# Patient Record
Sex: MALE | Race: WHITE | NOT HISPANIC OR LATINO | Employment: FULL TIME | URBAN - METROPOLITAN AREA
[De-identification: names, ages, dates, MRNs, and addresses within clinical notes are randomized per-mention and may not be internally consistent; named-entity substitution may affect disease eponyms.]

---

## 2017-11-13 ENCOUNTER — APPOINTMENT (OUTPATIENT)
Dept: SOCIAL WORK | Facility: CLINIC | Age: 32
End: 2017-11-13
Payer: COMMERCIAL

## 2017-11-13 PROCEDURE — 90686 IIV4 VACC NO PRSV 0.5 ML IM: CPT | Performed by: REGISTERED NURSE

## 2017-11-13 PROCEDURE — 90471 IMMUNIZATION ADMIN: CPT | Performed by: REGISTERED NURSE

## 2020-05-25 ENCOUNTER — OFFICE VISIT (OUTPATIENT)
Dept: ADMISSIONS | Facility: MEDICAL CENTER | Age: 35
End: 2020-05-25
Attending: ORTHOPAEDIC SURGERY
Payer: COMMERCIAL

## 2020-05-25 DIAGNOSIS — Z01.812 PRE-OPERATIVE LABORATORY EXAMINATION: ICD-10-CM

## 2020-05-25 LAB — COVID ORDER STATUS COVID19: NORMAL

## 2020-05-25 PROCEDURE — C9803 HOPD COVID-19 SPEC COLLECT: HCPCS

## 2020-05-25 RX ORDER — LANSOPRAZOLE 30 MG/1
CAPSULE, DELAYED RELEASE ORAL
COMMUNITY
Start: 2020-04-05

## 2020-05-25 NOTE — OR NURSING
"Preadmit appointment: \" Preparing for your Procedure information\" sheet given to patient with verbal and written instructions. Patient instructed to continue prescribed medications through the day before surgery, instructed to take the following medications the day of surgery per anesthesia protocol: prevacid.  "

## 2020-05-28 ENCOUNTER — ANESTHESIA EVENT (OUTPATIENT)
Dept: SURGERY | Facility: MEDICAL CENTER | Age: 35
End: 2020-05-28
Payer: COMMERCIAL

## 2020-05-28 ENCOUNTER — ANESTHESIA (OUTPATIENT)
Dept: SURGERY | Facility: MEDICAL CENTER | Age: 35
End: 2020-05-28
Payer: COMMERCIAL

## 2020-05-28 ENCOUNTER — HOSPITAL ENCOUNTER (OUTPATIENT)
Facility: MEDICAL CENTER | Age: 35
End: 2020-05-28
Attending: ORTHOPAEDIC SURGERY | Admitting: ORTHOPAEDIC SURGERY
Payer: COMMERCIAL

## 2020-05-28 VITALS
TEMPERATURE: 97.5 F | DIASTOLIC BLOOD PRESSURE: 82 MMHG | SYSTOLIC BLOOD PRESSURE: 120 MMHG | HEART RATE: 79 BPM | OXYGEN SATURATION: 92 % | RESPIRATION RATE: 16 BRPM | BODY MASS INDEX: 28.99 KG/M2 | HEIGHT: 72 IN | WEIGHT: 214.07 LBS

## 2020-05-28 LAB
SARS-COV-2 RNA RESP QL NAA+PROBE: NOT DETECTED
SPECIMEN SOURCE: NORMAL

## 2020-05-28 PROCEDURE — 160025 RECOVERY II MINUTES (STATS): Performed by: ORTHOPAEDIC SURGERY

## 2020-05-28 PROCEDURE — C1713 ANCHOR/SCREW BN/BN,TIS/BN: HCPCS | Performed by: ORTHOPAEDIC SURGERY

## 2020-05-28 PROCEDURE — 160041 HCHG SURGERY MINUTES - EA ADDL 1 MIN LEVEL 4: Performed by: ORTHOPAEDIC SURGERY

## 2020-05-28 PROCEDURE — 160009 HCHG ANES TIME/MIN: Performed by: ORTHOPAEDIC SURGERY

## 2020-05-28 PROCEDURE — 160029 HCHG SURGERY MINUTES - 1ST 30 MINS LEVEL 4: Performed by: ORTHOPAEDIC SURGERY

## 2020-05-28 PROCEDURE — A9270 NON-COVERED ITEM OR SERVICE: HCPCS | Performed by: ANESTHESIOLOGY

## 2020-05-28 PROCEDURE — 700102 HCHG RX REV CODE 250 W/ 637 OVERRIDE(OP): Performed by: ORTHOPAEDIC SURGERY

## 2020-05-28 PROCEDURE — 160048 HCHG OR STATISTICAL LEVEL 1-5: Performed by: ORTHOPAEDIC SURGERY

## 2020-05-28 PROCEDURE — 160046 HCHG PACU - 1ST 60 MINS PHASE II: Performed by: ORTHOPAEDIC SURGERY

## 2020-05-28 PROCEDURE — 160035 HCHG PACU - 1ST 60 MINS PHASE I: Performed by: ORTHOPAEDIC SURGERY

## 2020-05-28 PROCEDURE — A6403 STERILE GAUZE>16 <= 48 SQ IN: HCPCS | Performed by: ORTHOPAEDIC SURGERY

## 2020-05-28 PROCEDURE — 160002 HCHG RECOVERY MINUTES (STAT): Performed by: ORTHOPAEDIC SURGERY

## 2020-05-28 PROCEDURE — E0114 CRUTCH UNDERARM PAIR NO WOOD: HCPCS | Performed by: ORTHOPAEDIC SURGERY

## 2020-05-28 PROCEDURE — 502580 HCHG PACK, KNEE ARTHROSCOPY: Performed by: ORTHOPAEDIC SURGERY

## 2020-05-28 PROCEDURE — 700111 HCHG RX REV CODE 636 W/ 250 OVERRIDE (IP): Performed by: ORTHOPAEDIC SURGERY

## 2020-05-28 PROCEDURE — 501838 HCHG SUTURE GENERAL: Performed by: ORTHOPAEDIC SURGERY

## 2020-05-28 PROCEDURE — 700101 HCHG RX REV CODE 250: Performed by: ANESTHESIOLOGY

## 2020-05-28 PROCEDURE — 700101 HCHG RX REV CODE 250: Performed by: ORTHOPAEDIC SURGERY

## 2020-05-28 PROCEDURE — A6223 GAUZE >16<=48 NO W/SAL W/O B: HCPCS | Performed by: ORTHOPAEDIC SURGERY

## 2020-05-28 PROCEDURE — 501512 HCHG SUTURE PASSER, KNOT PUSHER: Performed by: ORTHOPAEDIC SURGERY

## 2020-05-28 PROCEDURE — 700105 HCHG RX REV CODE 258: Performed by: ANESTHESIOLOGY

## 2020-05-28 PROCEDURE — 500028 HCHG ARTHROWAND TURBOVAC 3.5/90 SUCT.: Performed by: ORTHOPAEDIC SURGERY

## 2020-05-28 PROCEDURE — 700111 HCHG RX REV CODE 636 W/ 250 OVERRIDE (IP): Performed by: ANESTHESIOLOGY

## 2020-05-28 PROCEDURE — 700105 HCHG RX REV CODE 258: Performed by: ORTHOPAEDIC SURGERY

## 2020-05-28 PROCEDURE — A9270 NON-COVERED ITEM OR SERVICE: HCPCS | Performed by: ORTHOPAEDIC SURGERY

## 2020-05-28 PROCEDURE — 160036 HCHG PACU - EA ADDL 30 MINS PHASE I: Performed by: ORTHOPAEDIC SURGERY

## 2020-05-28 PROCEDURE — 700102 HCHG RX REV CODE 250 W/ 637 OVERRIDE(OP): Performed by: ANESTHESIOLOGY

## 2020-05-28 DEVICE — ANCHOR SUTURE FASTFIX 360 CURVED: Type: IMPLANTABLE DEVICE | Site: KNEE | Status: FUNCTIONAL

## 2020-05-28 RX ORDER — MORPHINE SULFATE 10 MG/ML
INJECTION, SOLUTION INTRAMUSCULAR; INTRAVENOUS
Status: DISCONTINUED | OUTPATIENT
Start: 2020-05-28 | End: 2020-05-28 | Stop reason: HOSPADM

## 2020-05-28 RX ORDER — ONDANSETRON 2 MG/ML
4 INJECTION INTRAMUSCULAR; INTRAVENOUS
Status: DISCONTINUED | OUTPATIENT
Start: 2020-05-28 | End: 2020-05-28 | Stop reason: HOSPADM

## 2020-05-28 RX ORDER — SODIUM CHLORIDE, SODIUM LACTATE, POTASSIUM CHLORIDE, CALCIUM CHLORIDE 600; 310; 30; 20 MG/100ML; MG/100ML; MG/100ML; MG/100ML
1000 INJECTION, SOLUTION INTRAVENOUS
Status: COMPLETED | OUTPATIENT
Start: 2020-05-28 | End: 2020-05-28

## 2020-05-28 RX ORDER — ACETAMINOPHEN 500 MG
1000 TABLET ORAL ONCE
Status: DISCONTINUED | OUTPATIENT
Start: 2020-05-28 | End: 2020-05-28 | Stop reason: HOSPADM

## 2020-05-28 RX ORDER — OXYCODONE HCL 5 MG/5 ML
5 SOLUTION, ORAL ORAL
Status: COMPLETED | OUTPATIENT
Start: 2020-05-28 | End: 2020-05-28

## 2020-05-28 RX ORDER — LABETALOL HYDROCHLORIDE 5 MG/ML
5 INJECTION, SOLUTION INTRAVENOUS
Status: DISCONTINUED | OUTPATIENT
Start: 2020-05-28 | End: 2020-05-28 | Stop reason: HOSPADM

## 2020-05-28 RX ORDER — SODIUM CHLORIDE, SODIUM LACTATE, POTASSIUM CHLORIDE, CALCIUM CHLORIDE 600; 310; 30; 20 MG/100ML; MG/100ML; MG/100ML; MG/100ML
INJECTION, SOLUTION INTRAVENOUS
Status: DISCONTINUED | OUTPATIENT
Start: 2020-05-28 | End: 2020-05-28 | Stop reason: SURG

## 2020-05-28 RX ORDER — BUPIVACAINE HYDROCHLORIDE AND EPINEPHRINE 2.5; 5 MG/ML; UG/ML
INJECTION, SOLUTION EPIDURAL; INFILTRATION; INTRACAUDAL; PERINEURAL
Status: DISCONTINUED | OUTPATIENT
Start: 2020-05-28 | End: 2020-05-28 | Stop reason: HOSPADM

## 2020-05-28 RX ORDER — CELECOXIB 200 MG/1
400 CAPSULE ORAL ONCE
Status: DISCONTINUED | OUTPATIENT
Start: 2020-05-28 | End: 2020-05-28 | Stop reason: HOSPADM

## 2020-05-28 RX ORDER — OXYCODONE HCL 5 MG/5 ML
10 SOLUTION, ORAL ORAL
Status: COMPLETED | OUTPATIENT
Start: 2020-05-28 | End: 2020-05-28

## 2020-05-28 RX ORDER — LIDOCAINE HYDROCHLORIDE 10 MG/ML
INJECTION, SOLUTION INFILTRATION; PERINEURAL
Status: DISCONTINUED | OUTPATIENT
Start: 2020-05-28 | End: 2020-05-28 | Stop reason: HOSPADM

## 2020-05-28 RX ORDER — DEXAMETHASONE SODIUM PHOSPHATE 4 MG/ML
INJECTION, SOLUTION INTRA-ARTICULAR; INTRALESIONAL; INTRAMUSCULAR; INTRAVENOUS; SOFT TISSUE PRN
Status: DISCONTINUED | OUTPATIENT
Start: 2020-05-28 | End: 2020-05-28 | Stop reason: SURG

## 2020-05-28 RX ORDER — ONDANSETRON 2 MG/ML
INJECTION INTRAMUSCULAR; INTRAVENOUS PRN
Status: DISCONTINUED | OUTPATIENT
Start: 2020-05-28 | End: 2020-05-28 | Stop reason: SURG

## 2020-05-28 RX ORDER — MIDAZOLAM HYDROCHLORIDE 1 MG/ML
INJECTION INTRAMUSCULAR; INTRAVENOUS PRN
Status: DISCONTINUED | OUTPATIENT
Start: 2020-05-28 | End: 2020-05-28 | Stop reason: SURG

## 2020-05-28 RX ORDER — CEFAZOLIN SODIUM 1 G/3ML
INJECTION, POWDER, FOR SOLUTION INTRAMUSCULAR; INTRAVENOUS PRN
Status: DISCONTINUED | OUTPATIENT
Start: 2020-05-28 | End: 2020-05-28 | Stop reason: SURG

## 2020-05-28 RX ORDER — HYDRALAZINE HYDROCHLORIDE 20 MG/ML
5 INJECTION INTRAMUSCULAR; INTRAVENOUS
Status: DISCONTINUED | OUTPATIENT
Start: 2020-05-28 | End: 2020-05-28 | Stop reason: HOSPADM

## 2020-05-28 RX ORDER — HALOPERIDOL 5 MG/ML
1 INJECTION INTRAMUSCULAR
Status: DISCONTINUED | OUTPATIENT
Start: 2020-05-28 | End: 2020-05-28 | Stop reason: HOSPADM

## 2020-05-28 RX ORDER — SODIUM CHLORIDE, SODIUM LACTATE, POTASSIUM CHLORIDE, CALCIUM CHLORIDE 600; 310; 30; 20 MG/100ML; MG/100ML; MG/100ML; MG/100ML
INJECTION, SOLUTION INTRAVENOUS CONTINUOUS
Status: DISCONTINUED | OUTPATIENT
Start: 2020-05-28 | End: 2020-05-28 | Stop reason: HOSPADM

## 2020-05-28 RX ORDER — LIDOCAINE HYDROCHLORIDE 20 MG/ML
INJECTION, SOLUTION EPIDURAL; INFILTRATION; INTRACAUDAL; PERINEURAL PRN
Status: DISCONTINUED | OUTPATIENT
Start: 2020-05-28 | End: 2020-05-28 | Stop reason: SURG

## 2020-05-28 RX ORDER — DIPHENHYDRAMINE HYDROCHLORIDE 50 MG/ML
12.5 INJECTION INTRAMUSCULAR; INTRAVENOUS
Status: DISCONTINUED | OUTPATIENT
Start: 2020-05-28 | End: 2020-05-28 | Stop reason: HOSPADM

## 2020-05-28 RX ADMIN — FENTANYL CITRATE 25 MCG: 50 INJECTION INTRAMUSCULAR; INTRAVENOUS at 14:09

## 2020-05-28 RX ADMIN — FENTANYL CITRATE 25 MCG: 50 INJECTION INTRAMUSCULAR; INTRAVENOUS at 14:34

## 2020-05-28 RX ADMIN — OXYCODONE HYDROCHLORIDE 5 MG: 5 SOLUTION ORAL at 14:12

## 2020-05-28 RX ADMIN — FENTANYL CITRATE 200 MCG: 50 INJECTION, SOLUTION INTRAMUSCULAR; INTRAVENOUS at 12:48

## 2020-05-28 RX ADMIN — LIDOCAINE HYDROCHLORIDE 100 MG: 20 INJECTION, SOLUTION EPIDURAL; INFILTRATION; INTRACAUDAL; PERINEURAL at 12:48

## 2020-05-28 RX ADMIN — MIDAZOLAM HYDROCHLORIDE 2 MG: 1 INJECTION, SOLUTION INTRAMUSCULAR; INTRAVENOUS at 12:46

## 2020-05-28 RX ADMIN — ONDANSETRON 4 MG: 2 INJECTION INTRAMUSCULAR; INTRAVENOUS at 12:58

## 2020-05-28 RX ADMIN — PROPOFOL 200 MG: 10 INJECTION, EMULSION INTRAVENOUS at 12:48

## 2020-05-28 RX ADMIN — CEFAZOLIN 2 G: 1 INJECTION, POWDER, FOR SOLUTION INTRAVENOUS at 12:48

## 2020-05-28 RX ADMIN — SODIUM CHLORIDE, POTASSIUM CHLORIDE, SODIUM LACTATE AND CALCIUM CHLORIDE 1000 ML: 600; 310; 30; 20 INJECTION, SOLUTION INTRAVENOUS at 12:40

## 2020-05-28 RX ADMIN — SODIUM CHLORIDE, POTASSIUM CHLORIDE, SODIUM LACTATE AND CALCIUM CHLORIDE: 600; 310; 30; 20 INJECTION, SOLUTION INTRAVENOUS at 12:48

## 2020-05-28 RX ADMIN — FENTANYL CITRATE 25 MCG: 50 INJECTION INTRAMUSCULAR; INTRAVENOUS at 14:19

## 2020-05-28 RX ADMIN — DEXAMETHASONE SODIUM PHOSPHATE 8 MG: 4 INJECTION, SOLUTION INTRAMUSCULAR; INTRAVENOUS at 12:48

## 2020-05-28 RX ADMIN — POVIDONE-IODINE 15 ML: 10 SOLUTION TOPICAL at 12:22

## 2020-05-28 ASSESSMENT — PAIN SCALES - GENERAL: PAIN_LEVEL: 2

## 2020-05-28 NOTE — DISCHARGE INSTRUCTIONS
ACTIVITY: Rest and take it easy for the first 24 hours.  A responsible adult is recommended to remain with you during that time.  It is normal to feel sleepy.  We encourage you to not do anything that requires balance, judgment or coordination.    MILD FLU-LIKE SYMPTOMS ARE NORMAL. YOU MAY EXPERIENCE GENERALIZED MUSCLE ACHES, THROAT IRRITATION, HEADACHE AND/OR SOME NAUSEA.    FOR 24 HOURS DO NOT:  Drive, operate machinery or run household appliances.  Drink beer or alcoholic beverages.   Make important decisions or sign legal documents.    SPECIAL INSTRUCTIONS:   Ambulate Post-op day #0 (Thursday, May 28th) with assistance.  Keep dressings clean dry and intact for 48 hours.  Able to remove dressing in 48 hours, leave steri-strips in place, or if they have fallen off apply band aids over the incisions.  May shower in 48 hours, after dressings are removed.  No submerging for 7-10 days.  Ice and elevate operative extremity.  Do not place a pillow under your knee.   Wear white elastic stockings for 3 days before removing, then able to remove, rinse out and hang to dry, wear them during the day until you follow up with MD.  Keep the brace in place for 4 weeks, may remove to shower.  Non-weight bearing for 4 weeks, then partial weight bearing for 2 weeks, then weight bearing as tolerated.  No flexion beyond 90 degrees, follow Dr Seals's orders for brace angles.  Therapy to start 2-3 days after your surgery.    DIET: To avoid nausea, slowly advance diet as tolerated, avoiding spicy or greasy foods for the first day.  Add more substantial food to your diet according to your physician's instructions. INCREASE FLUIDS AND FIBER TO AVOID CONSTIPATION.    FOLLOW-UP APPOINTMENT:  A follow-up appointment should be arranged with your doctor in; call to schedule.    You should CALL YOUR PHYSICIAN if you develop:  Fever greater than 101 degrees F.  Pain not relieved by medication, or persistent nausea or vomiting.  Excessive  bleeding (blood soaking through dressing) or unexpected drainage from the wound.  Extreme redness or swelling around the incision site, drainage of pus or foul smelling drainage.  Inability to urinate or empty your bladder within 8 hours.  Problems with breathing or chest pain.    You should call 911 if you develop problems with breathing or chest pain.  If you are unable to contact your doctor or surgical center, you should go to the nearest emergency room or urgent care center.      Dr Seals's telephone #: [phone]    If any questions arise, call your doctor.  If your doctor is not available, please feel free to call the Surgical Center at [phone].  The Center is open Monday through Friday from 7AM to 7PM.  You can also call the [facility] HOTLINE open 24 hours/day, 7 days/week and speak to a nurse at [phone], or toll free at [phone].    A registered nurse may call you a few days after your surgery to see how you are doing after your procedure.    MEDICATIONS: Resume taking daily medication.  Take prescribed pain medication with food.  If no medication is prescribed, you may take non-aspirin pain medication if needed.  PAIN MEDICATION CAN BE VERY CONSTIPATING.  Take a stool softener or laxative such as senokot, pericolace, or milk of magnesia if needed.    Prescription given for Asprin, Norco, Naproxen, Zofran.  Last pain medication given at 2:12 PM.    If your physician has prescribed pain medication that includes Acetaminophen (Tylenol), do not take additional Acetaminophen (Tylenol) while taking the prescribed medication.    Depression / Suicide Risk    As you are discharged from this [facility] facility, it is important to learn how to keep safe from harming yourself.    Recognize the warning signs:  · Abrupt changes in personality, positive or negative- including increase in energy   · Giving away possessions  · Change in eating patterns- significant weight changes-  positive or  negative  · Change in sleeping patterns- unable to sleep or sleeping all the time   · Unwillingness or inability to communicate  · Depression  · Unusual sadness, discouragement and loneliness  · Talk of wanting to die  · Neglect of personal appearance   · Rebelliousness- reckless behavior  · Withdrawal from people/activities they love  · Confusion- inability to concentrate     If you or a loved one observes any of these behaviors or has concerns about self-harm, here's what you can do:  · Talk about it- your feelings and reasons for harming yourself  · Remove any means that you might use to hurt yourself (examples: pills, rope, extension cords, firearm)  · Get professional help from the community (Mental Health, Substance Abuse, psychological counseling)  · Do not be alone:Call your Safe Contact- someone whom you trust who will be there for you.  · Call your local CRISIS HOTLINE 129-2413 or 630-414-4008  · Call your local Children's Mobile Crisis Response Team [place] (817) 964-2655 or www.MobileHandshake  · Call the toll free National Suicide Prevention Hotlines   · National Suicide Prevention Lifeline 916-482-QEZN (1609)  · National Hope Line Network 800-SUICIDE (857-7092)

## 2020-05-28 NOTE — ANESTHESIA PREPROCEDURE EVALUATION
35 yo male for right knee scope medial meniscus    [facility] cholecystectomy 2012    Relevant Problems   No relevant active problems       Physical Exam    Airway   Mallampati: II  TM distance: >3 FB  Neck ROM: full       Cardiovascular - normal exam  Rhythm: regular  Rate: normal  (-) murmur     Dental - normal exam           Pulmonary - normal exam  Breath sounds clear to auscultation     Abdominal    Neurological - normal exam                 Anesthesia Plan    ASA 2       Plan - general       Airway plan will be LMA      Plan Factors:   Patient was previously instructed to abstain from smoking on day of procedure.  Patient did not smoke on day of procedure.      Induction: intravenous    Postoperative Plan: Postoperative administration of opioids is intended.    Pertinent diagnostic labs and testing reviewed    Informed Consent:    Anesthetic plan and risks discussed with patient.    Use of blood products discussed with: patient whom consented to blood products.

## 2020-05-28 NOTE — OR SURGEON
Immediate Post OP Note    PreOp Diagnosis: R knee medial meniscus tear    PostOp Diagnosis: R knee medial meniscus repair    Procedure(s):  ARTHROSCOPY, KNEE - Wound Class: Clean  REPAIR, MENISCUS, KNEE, MEDIAL - Wound Class: Clean    Surgeon(s):  Isabel Seals M.D.    Anesthesiologist/Type of Anesthesia:  Anesthesiologist: Mejia Reyes M.D./General    Surgical Staff:  Circulator: Teresita Mejía R.N.  Scrub Person: Gabriela Carter    Specimens removed if any:  None    Estimated Blood Loss: <5 mL    Findings: vertical tear in red red zone of posterior horn of medial meniscus    Complications: none        5/28/2020 1:52 PM Isabel Seals M.D.

## 2020-05-28 NOTE — OP REPORT
DATE OF SERVICE:  5/28/2020     PREOPERATIVE DIAGNOSES:  1.  Right knee medial meniscus tear.     POSTOPERATIVE DIAGNOSES:  1.  Right knee medial meniscus tear.     PROCEDURE PERFORMED:  1.  Right knee diagnostic arthroscopy.  2.  Right knee all-inside medial meniscus repair  3.  Right knee microfracture of the notch to aid with meniscal healing     ANESTHESIA:  General.     ANESTHESIOLOGIST:  Mejia Reyes M.D.     ASSISTANT:  first Cleopatra assist     SPECIMENS:  None.     ESTIMATED BLOOD LOSS:  Less than 5 mL    IMPLANTS: FastFix meniscal repair anchors x 3     TOURNIQUET TIME:  None.     FINDINGS:  There was a vertical tear of the posterior horn of the medial meniscus in the red-red zone.  The cartilage throughout the knee was pristine.  The ACL, PCL, and lateral meniscus were intact and pristine.     COMPLICATIONS:  None.     POST-OPERATIVE PLAN:  The patient will be non-weightbearing in a knee range of motion brace for the next 4 weeks.  The patient will start physical therapy in the next 2-3 days.  I will see the patient back in clinic in 7-10 days for suture removal.     INDICATIONS:  The patient is a very nice 34 year old, active male who presented to clinic with worsening medial-sided right knee pain after a squatting and twisting incident a few months ago. The patient had tried and failed conservative management including activity modification, bracing, anti-inflammatories and a home exercise program and his pain persisted; therefore, the patient elected to proceed with surgery.  I had a long discussion with the patient regarding the risks and benefits of surgery, including but not limited to bleeding, infection, risk to surrounding structures such as blood vessels or nerves, pain, scar, reoperation, and risks of anesthesia, such as stroke, heart attack, deep venous thrombosis, pulmonary embolism, and death.  The patient understood the risks and benefits and elected to proceed with surgery.      PROCEDURE IN DETAIL:  The patient was met in the preoperative hold area where the correct right lower extremity was marked with my initials.  The patient was then transported to the operating room where the patient was placed supine on the operating room table with all bony prominences well padded.  2g of preoperative Ancef was given.  The patient was intubated by the anesthesia team without complications.  Preoperative exam under anesthesia revealed right knee with a mild knee effusion, full range of motion, 0-120 degrees.  The knee was stable to varus and valgus stressing.  There was a negative Lachman, negative posterior drawer.      The patient's right lower extremity was then prepped and draped in the usual sterile fashion.  A preoperative timeout was held with all those in the room agreed upon the correct patient, the correct site of surgery and the correct surgery to be performed.     I began the procedure by injecting a combination totaling 50 mL of 0.25% Marcaine with epinephrine and 1% lidocaine into the operative knee via the superior-lateral approach.  I then made a 1 cm vertical incision for my anterolateral portal using the 11 blade.  The scope was then inserted into the knee.  The patellofemoral joint showed pristine cartilage. There were no loose bodies in the medial or lateral gutter.  The knee was then placed in full extension with a valgus stress and I made my anterior medial portal under direct visualization with a spinal needle.  Probing the medial meniscus revealed a vertical, unstable tear of the posterior horn in the red-red zone.  Given the location and direction of the tear, I elected at this point to proceed with a medial meniscus repair.  The cartilage of the medial compartment was pristine.  The knee was then placed in 90 degrees and I was able to identify an intact and competent ACL and PCL.  The knee was then placed in figure-of-four position and the lateral meniscus was intact  without tears.  The cartilage of the lateral compartment was pristine.      I then placed the knee back in extension with a valgus stress to perform the medial meniscus repair.  The meniscal rasp was used to rasp the meniscus to get to nice, healthy bleeding tissue.  Next, the sled was used to introduce a FastFix meniscal repair anchor into the knee and this was placed in a vertical mattress fashion across the tear.  I used a total of 3 FastFix meniscal repair anchors for the tear.  Once the repair was complete, I then placed the knee in 90 degrees of flexion and used the microfracture awl to microfracture the notch to aid with meniscal healing.    I then placed the knee back in full extension and the knee was then copiously irrigated with arthroscopic fluid and I inserted a spinal needle under direct visualization via the superolateral aspect of the knee for placement of my postoperative pain injection.  The scope was then removed from the knee and the portals were closed with 3-0 Prolene suture.  A combination of Duramorph and 0.25% Marcaine with epinephrine was then injected to the knee via the previously placed spinal needle.  Sterile dressings were then applied to the knee and the patient was awoken from anesthesia without complications.  Please note that first sid Whelan, was necessary and critical for all portions of the procedure.  All counts were correct at the end of the case.       Isabel Seals MD

## 2020-05-28 NOTE — ANESTHESIA PROCEDURE NOTES
Airway    Date/Time: 5/28/2020 12:49 PM  Performed by: Mejia Reyes M.D.  Authorized by: Mejia Reyes M.D.     Location:  OR  Urgency:  Elective  Difficult Airway: No    Indications for Airway Management:  Anesthesia      Spontaneous Ventilation: absent    Sedation Level:  Deep  Preoxygenated: Yes    MILS Maintained Throughout: No    Mask Difficulty Assessment:  0 - not attempted  Final Airway Type:  Supraglottic airway  Final Supraglottic Airway:  Standard LMA    SGA Size:  4  Number of Attempts at Approach:  1

## 2020-05-28 NOTE — ANESTHESIA POSTPROCEDURE EVALUATION
Patient: Darnell Hannon    Procedure Summary     Date:  05/28/20 Room / Location:  [place] [id number] / SURGERY [facility]    Anesthesia Start:  1246 Anesthesia Stop:  1353    Procedures:       ARTHROSCOPY, KNEE (Right Knee)      REPAIR, MENISCUS, KNEE, MEDIAL (Right Knee) Diagnosis:  (COMPLEX TEAR OF MEDIAL MENISCUS RIGHT KNEE)    Surgeon:  Isabel Seals M.D. Responsible Provider:  Mejia Reyes M.D.    Anesthesia Type:  general ASA Status:  2          Final Anesthesia Type: general  Last vitals  BP   Blood Pressure: 160/93    Temp   37.1 °C (98.7 °F)    Pulse   Pulse: 75   Resp   18    SpO2   97 %      Anesthesia Post Evaluation    Patient location during evaluation: PACU  Patient participation: complete - patient participated  Level of consciousness: sleepy but conscious  Pain score: 2    Airway patency: patent  Anesthetic complications: no  Cardiovascular status: hemodynamically stable  Respiratory status: acceptable  Hydration status: euvolemic    PONV: none           Nurse Pain Score: 0 (NPRS)

## 2020-05-28 NOTE — OR NURSING
1531 PT DRESSED, TRANSFERRED TO RECLINER.  PT DRESSED, TRANSFERRED TO RECLINER. VSS    1624 PT MEETS CRITERIA FOR D/C TO HOME.

## 2020-05-28 NOTE — OR NURSING
1350: To PACU from OR via gurney, Icepack applied over c/d/i R knee surgical dressings.sleeping, respirations spontaneous and non-labored via OPA.   1401: Pt awake, able to lift head and open mouth, OPA removed, pt able to cough on request. Pain is tolerable, no nausea, sleepy, but arouses to voice.  1409: Pain increased, no nausea, pain medication given per MAR, tolerating decrease of supplemental O2.  1419: Pain not tolerable, pain medication given per MAR, no nausea, still sleepy, but arouses to voice. Dressing remains CDI.  1434: Pain not quite tolerable, pain medication given per MAR, no nausea, tolerating sips of water and juice, and tolerating decrease of supplemental O2.  1445: Pain is more tolerable, pt still sleepy, but arouses spontaneously and to voice, no nausea.  1500: Tolerating room air, but pt sitting in the low 90s, started him on an IS, able to pull 5086-0923 ml, his goal is 3450 ml.  1515: Pain is tolerable, more awake and alert, helped pt sit up in the gurney more, tolerating it well, No nausea, dressing is CDI.  1530: Meets criteria to transfer to Stage 2, report called to [place], RN and pt readied for transfer, pain remains tolerable, no nausea, awake and alert, tolerating room air, dressing CDI, immobilizer in place and locked, non-weight bearing, RN and CNA notified.

## 2020-10-13 ENCOUNTER — PRE-ADMISSION TESTING (OUTPATIENT)
Dept: ADMISSIONS | Facility: MEDICAL CENTER | Age: 35
End: 2020-10-13
Attending: ORTHOPAEDIC SURGERY
Payer: COMMERCIAL

## 2020-10-13 DIAGNOSIS — Z01.812 PRE-OPERATIVE LABORATORY EXAMINATION: ICD-10-CM

## 2020-10-13 LAB — COVID ORDER STATUS COVID19: NORMAL

## 2020-10-13 PROCEDURE — U0003 INFECTIOUS AGENT DETECTION BY NUCLEIC ACID (DNA OR RNA); SEVERE ACUTE RESPIRATORY SYNDROME CORONAVIRUS 2 (SARS-COV-2) (CORONAVIRUS DISEASE [COVID-19]), AMPLIFIED PROBE TECHNIQUE, MAKING USE OF HIGH THROUGHPUT TECHNOLOGIES AS DESCRIBED BY CMS-2020-01-R: HCPCS

## 2020-10-13 PROCEDURE — C9803 HOPD COVID-19 SPEC COLLECT: HCPCS

## 2020-10-13 NOTE — OR NURSING
Pre admit apt: Pt. Instructed to continue regularly prescribed medications through day before surgery.  Instructed to take the following medications, the day of surgery, with a sip of water per anesthesia protocol: Preacid    Covid test 10/13. Pt. Given written and verbal instructions re: self isolation and will call surgeon if develops any new symptoms of covid.

## 2020-10-14 LAB
SARS-COV-2 RNA RESP QL NAA+PROBE: NOTDETECTED
SPECIMEN SOURCE: NORMAL

## 2020-10-15 ENCOUNTER — HOSPITAL ENCOUNTER (OUTPATIENT)
Facility: MEDICAL CENTER | Age: 35
End: 2020-10-15
Attending: ORTHOPAEDIC SURGERY | Admitting: ORTHOPAEDIC SURGERY
Payer: COMMERCIAL

## 2020-10-15 ENCOUNTER — ANESTHESIA EVENT (OUTPATIENT)
Dept: SURGERY | Facility: MEDICAL CENTER | Age: 35
End: 2020-10-15
Payer: COMMERCIAL

## 2020-10-15 ENCOUNTER — ANESTHESIA (OUTPATIENT)
Dept: SURGERY | Facility: MEDICAL CENTER | Age: 35
End: 2020-10-15
Payer: COMMERCIAL

## 2020-10-15 VITALS
HEIGHT: 72 IN | HEART RATE: 97 BPM | WEIGHT: 222.66 LBS | RESPIRATION RATE: 16 BRPM | OXYGEN SATURATION: 95 % | DIASTOLIC BLOOD PRESSURE: 85 MMHG | BODY MASS INDEX: 30.16 KG/M2 | TEMPERATURE: 98.6 F | SYSTOLIC BLOOD PRESSURE: 136 MMHG

## 2020-10-15 PROCEDURE — 160009 HCHG ANES TIME/MIN: Performed by: ORTHOPAEDIC SURGERY

## 2020-10-15 PROCEDURE — 700105 HCHG RX REV CODE 258: Performed by: ORTHOPAEDIC SURGERY

## 2020-10-15 PROCEDURE — 501838 HCHG SUTURE GENERAL: Performed by: ORTHOPAEDIC SURGERY

## 2020-10-15 PROCEDURE — 160046 HCHG PACU - 1ST 60 MINS PHASE II: Performed by: ORTHOPAEDIC SURGERY

## 2020-10-15 PROCEDURE — 700111 HCHG RX REV CODE 636 W/ 250 OVERRIDE (IP): Performed by: ANESTHESIOLOGY

## 2020-10-15 PROCEDURE — 700111 HCHG RX REV CODE 636 W/ 250 OVERRIDE (IP): Performed by: ORTHOPAEDIC SURGERY

## 2020-10-15 PROCEDURE — 700101 HCHG RX REV CODE 250: Performed by: ORTHOPAEDIC SURGERY

## 2020-10-15 PROCEDURE — A9270 NON-COVERED ITEM OR SERVICE: HCPCS | Performed by: ANESTHESIOLOGY

## 2020-10-15 PROCEDURE — 160029 HCHG SURGERY MINUTES - 1ST 30 MINS LEVEL 4: Performed by: ORTHOPAEDIC SURGERY

## 2020-10-15 PROCEDURE — 700102 HCHG RX REV CODE 250 W/ 637 OVERRIDE(OP): Performed by: ANESTHESIOLOGY

## 2020-10-15 PROCEDURE — 160002 HCHG RECOVERY MINUTES (STAT): Performed by: ORTHOPAEDIC SURGERY

## 2020-10-15 PROCEDURE — 160041 HCHG SURGERY MINUTES - EA ADDL 1 MIN LEVEL 4: Performed by: ORTHOPAEDIC SURGERY

## 2020-10-15 PROCEDURE — 500028 HCHG ARTHROWAND TURBOVAC 3.5/90 SUCT.: Performed by: ORTHOPAEDIC SURGERY

## 2020-10-15 PROCEDURE — 160036 HCHG PACU - EA ADDL 30 MINS PHASE I: Performed by: ORTHOPAEDIC SURGERY

## 2020-10-15 PROCEDURE — 502580 HCHG PACK, KNEE ARTHROSCOPY: Performed by: ORTHOPAEDIC SURGERY

## 2020-10-15 PROCEDURE — A6403 STERILE GAUZE>16 <= 48 SQ IN: HCPCS | Performed by: ORTHOPAEDIC SURGERY

## 2020-10-15 PROCEDURE — 160025 RECOVERY II MINUTES (STATS): Performed by: ORTHOPAEDIC SURGERY

## 2020-10-15 PROCEDURE — 700101 HCHG RX REV CODE 250: Performed by: ANESTHESIOLOGY

## 2020-10-15 PROCEDURE — E0114 CRUTCH UNDERARM PAIR NO WOOD: HCPCS | Performed by: ORTHOPAEDIC SURGERY

## 2020-10-15 PROCEDURE — A6223 GAUZE >16<=48 NO W/SAL W/O B: HCPCS | Performed by: ORTHOPAEDIC SURGERY

## 2020-10-15 PROCEDURE — 160035 HCHG PACU - 1ST 60 MINS PHASE I: Performed by: ORTHOPAEDIC SURGERY

## 2020-10-15 PROCEDURE — 160048 HCHG OR STATISTICAL LEVEL 1-5: Performed by: ORTHOPAEDIC SURGERY

## 2020-10-15 RX ORDER — BUPIVACAINE HYDROCHLORIDE AND EPINEPHRINE 2.5; 5 MG/ML; UG/ML
INJECTION, SOLUTION EPIDURAL; INFILTRATION; INTRACAUDAL; PERINEURAL
Status: DISCONTINUED | OUTPATIENT
Start: 2020-10-15 | End: 2020-10-15 | Stop reason: HOSPADM

## 2020-10-15 RX ORDER — CEFAZOLIN SODIUM 1 G/3ML
INJECTION, POWDER, FOR SOLUTION INTRAMUSCULAR; INTRAVENOUS PRN
Status: DISCONTINUED | OUTPATIENT
Start: 2020-10-15 | End: 2020-10-15 | Stop reason: SURG

## 2020-10-15 RX ORDER — OXYCODONE HCL 5 MG/5 ML
5 SOLUTION, ORAL ORAL
Status: COMPLETED | OUTPATIENT
Start: 2020-10-15 | End: 2020-10-15

## 2020-10-15 RX ORDER — DIPHENHYDRAMINE HYDROCHLORIDE 50 MG/ML
12.5 INJECTION INTRAMUSCULAR; INTRAVENOUS
Status: DISCONTINUED | OUTPATIENT
Start: 2020-10-15 | End: 2020-10-15 | Stop reason: HOSPADM

## 2020-10-15 RX ORDER — HALOPERIDOL 5 MG/ML
1 INJECTION INTRAMUSCULAR
Status: DISCONTINUED | OUTPATIENT
Start: 2020-10-15 | End: 2020-10-15 | Stop reason: HOSPADM

## 2020-10-15 RX ORDER — ONDANSETRON 2 MG/ML
INJECTION INTRAMUSCULAR; INTRAVENOUS PRN
Status: DISCONTINUED | OUTPATIENT
Start: 2020-10-15 | End: 2020-10-15 | Stop reason: SURG

## 2020-10-15 RX ORDER — ONDANSETRON 2 MG/ML
4 INJECTION INTRAMUSCULAR; INTRAVENOUS
Status: DISCONTINUED | OUTPATIENT
Start: 2020-10-15 | End: 2020-10-15 | Stop reason: HOSPADM

## 2020-10-15 RX ORDER — OXYCODONE HCL 5 MG/5 ML
10 SOLUTION, ORAL ORAL
Status: COMPLETED | OUTPATIENT
Start: 2020-10-15 | End: 2020-10-15

## 2020-10-15 RX ORDER — LIDOCAINE HYDROCHLORIDE 10 MG/ML
INJECTION, SOLUTION INFILTRATION; PERINEURAL
Status: DISCONTINUED | OUTPATIENT
Start: 2020-10-15 | End: 2020-10-15 | Stop reason: HOSPADM

## 2020-10-15 RX ORDER — SODIUM CHLORIDE, SODIUM LACTATE, POTASSIUM CHLORIDE, CALCIUM CHLORIDE 600; 310; 30; 20 MG/100ML; MG/100ML; MG/100ML; MG/100ML
INJECTION, SOLUTION INTRAVENOUS CONTINUOUS
Status: DISCONTINUED | OUTPATIENT
Start: 2020-10-15 | End: 2020-10-15 | Stop reason: HOSPADM

## 2020-10-15 RX ORDER — MEPERIDINE HYDROCHLORIDE 25 MG/ML
12.5 INJECTION INTRAMUSCULAR; INTRAVENOUS; SUBCUTANEOUS
Status: DISCONTINUED | OUTPATIENT
Start: 2020-10-15 | End: 2020-10-15 | Stop reason: HOSPADM

## 2020-10-15 RX ORDER — LIDOCAINE HYDROCHLORIDE 20 MG/ML
INJECTION, SOLUTION EPIDURAL; INFILTRATION; INTRACAUDAL; PERINEURAL PRN
Status: DISCONTINUED | OUTPATIENT
Start: 2020-10-15 | End: 2020-10-15 | Stop reason: SURG

## 2020-10-15 RX ORDER — MORPHINE SULFATE 0.5 MG/ML
INJECTION, SOLUTION EPIDURAL; INTRATHECAL; INTRAVENOUS
Status: DISCONTINUED | OUTPATIENT
Start: 2020-10-15 | End: 2020-10-15 | Stop reason: HOSPADM

## 2020-10-15 RX ORDER — KETOROLAC TROMETHAMINE 30 MG/ML
INJECTION, SOLUTION INTRAMUSCULAR; INTRAVENOUS PRN
Status: DISCONTINUED | OUTPATIENT
Start: 2020-10-15 | End: 2020-10-15 | Stop reason: SURG

## 2020-10-15 RX ADMIN — FENTANYL CITRATE 250 MCG: 50 INJECTION, SOLUTION INTRAMUSCULAR; INTRAVENOUS at 11:26

## 2020-10-15 RX ADMIN — FENTANYL CITRATE 50 MCG: 50 INJECTION, SOLUTION INTRAMUSCULAR; INTRAVENOUS at 13:13

## 2020-10-15 RX ADMIN — FENTANYL CITRATE 25 MCG: 50 INJECTION, SOLUTION INTRAMUSCULAR; INTRAVENOUS at 13:22

## 2020-10-15 RX ADMIN — ONDANSETRON 4 MG: 2 INJECTION INTRAMUSCULAR; INTRAVENOUS at 12:51

## 2020-10-15 RX ADMIN — MIDAZOLAM HYDROCHLORIDE 2 MG: 1 INJECTION, SOLUTION INTRAMUSCULAR; INTRAVENOUS at 11:19

## 2020-10-15 RX ADMIN — OXYCODONE HYDROCHLORIDE 10 MG: 5 SOLUTION ORAL at 13:42

## 2020-10-15 RX ADMIN — PROPOFOL 200 MG: 10 INJECTION, EMULSION INTRAVENOUS at 11:26

## 2020-10-15 RX ADMIN — SODIUM CHLORIDE, POTASSIUM CHLORIDE, SODIUM LACTATE AND CALCIUM CHLORIDE: 600; 310; 30; 20 INJECTION, SOLUTION INTRAVENOUS at 10:59

## 2020-10-15 RX ADMIN — FENTANYL CITRATE 25 MCG: 50 INJECTION, SOLUTION INTRAMUSCULAR; INTRAVENOUS at 13:06

## 2020-10-15 RX ADMIN — CEFAZOLIN 2 G: 1 INJECTION, POWDER, FOR SOLUTION INTRAVENOUS at 11:30

## 2020-10-15 RX ADMIN — WATER 15 ML: 100 IRRIGANT IRRIGATION at 10:40

## 2020-10-15 RX ADMIN — KETOROLAC TROMETHAMINE 30 MG: 30 INJECTION, SOLUTION INTRAMUSCULAR at 12:51

## 2020-10-15 RX ADMIN — LIDOCAINE HYDROCHLORIDE 100 MG: 20 INJECTION, SOLUTION EPIDURAL; INFILTRATION; INTRACAUDAL; PERINEURAL at 11:26

## 2020-10-15 NOTE — DISCHARGE INSTR - OTHER INFO
Discharge instructions:    General Instructions    • Wear your white stockings during the day - these help control the typical swelling in your legs after surgery and minimize the likelihood of blood clots.    • Elevate the operative extremity when you are resting to help minimize the swelling.    • Use ice to help control the swelling and pain.  DO NOT USE HEAT - this will increase the swelling.    • Call the office if you develop fevers (over 100.5) or chills.    • You should have an office appointment about 7-10 following your discharge from the hospital.  If you do not please call [phone].      Wound Care    • You may shower 2 days after surgery if the wound is dry. Keep water exposure to the incision site brief and blot it dry when you get out.  Do not bathe or swim or Jacuzzi (ie. Do not submerge the incision) for approximately 3 to 4 weeks.    • Do not use ointments or creams on the incision.      • Keep the incision clean and dry.  Any drainage from the incision should be reported to the doctor immediately.      • You may notice some bruising around the incision and into the operative extremity.  This is not uncommon and should begin to go away within the first 2 weeks after surgery.    • At the time of surgery tape-like strips (Steri-strips) may be placed on your incision to protect it.  These will eventually come off on their own in one to two weeks, or you may remove them yourself after two weeks.    Activity    • Unless otherwise instructed by your doctor you can put all of your weight on your operated leg.      • Estimated return to work varies depending on the demands of your job.  Some ambitious patients return to desk jobs / administrative type work as early as 1 week after surgery (but usually more like 1 month).  For active labor or heavy labor, it may take 3 to 6 months to return to work.     • You should do the exercises given to you at discharge until you return for your post-op visit. At  that time, you may be given a new set of exercises. You should continue to exercise until your muscles are pain-free and you can walk without a limp. It is a good idea to continue your exercises as a lifetime commitment to keep your muscles strong.    • The question of when to drive is impossible to generalize to everyone because it is largely dependent on the individual.  Importantly, doctors do not have a license with the DMV to “clear you” or “release you” to return to driving.  There are 3 primary criteria that must be met.  You need to be off of narcotic pain medicines (otherwise you are driving under the influence).  You need to be able to get in and out of the ’s seat comfortably.  And you must have regained your normal reflexes / strength.  We recommend ‘testing’ yourself with another licensed  in an empty parking lot or quiet street first in order to check your reflexes moving your foot from pedal to pedal.      Medications    • You were prescribed a short acting, narcotic pain medication.  It is recommended that you begin to wean off of this medication in about 3 days after surgery.  To help wean off of the pain medications or to supplement your pain control you can use Tylenol to help with pain.    • You were prescribed an anti-inflammatory medication which you will take for 5 days after surgery.  Take with food if GI discomfort occurs.      • You were prescribed an anti-nausea medication that you may take as needed.    • You will not be discharged from the hospital with any antibiotics unless there are specific concerns regarding infection.

## 2020-10-15 NOTE — ANESTHESIA TIME REPORT
Anesthesia Start and Stop Event Times     Date Time Event    10/15/2020 1119 Ready for Procedure     1119 Anesthesia Start     1305 Anesthesia Stop        Responsible Staff  10/15/20    Name Role Begin End    Ted Zheng M.D. Anesth 1119 1305        Preop Diagnosis (Free Text):  Pre-op Diagnosis     COMPLEX TEAR OF MEDIAL MENISCUS RIGHT KNEE, KNEE PAIN RIGHT        Preop Diagnosis (Codes):    Post op Diagnosis  Acute medial meniscal tear      Premium Reason  C. Post-1st,2nd,3rd,OB,RTC    Comments:

## 2020-10-15 NOTE — ANESTHESIA POSTPROCEDURE EVALUATION
Patient: Darnell Hannon    Procedure Summary     Date: 10/15/20 Room / Location: [place] [id number] / SURGERY [facility]    Anesthesia Start: 1119 Anesthesia Stop: 1252    Procedures:       ARTHROSCOPY, KNEE (Right Knee)      MENISCECTOMY, KNEE, MEDIAL - PARTIAL,  synovectomy loose body removal (Right Knee) Diagnosis: (COMPLEX TEAR OF MEDIAL MENISCUS RIGHT KNEE, KNEE PAIN RIGHT)    Surgeon: Isabel Seals M.D. Responsible Provider: Ted Zheng M.D.    Anesthesia Type: general ASA Status: 1          Final Anesthesia Type: general  Last vitals  BP   Blood Pressure: 141/96    Temp   36.7 °C (98.1 °F)    Pulse   Pulse: 74   Resp   18    SpO2   96 %      Anesthesia Post Evaluation    Patient location during evaluation: PACU  Patient participation: complete - patient participated  Level of consciousness: awake and alert    Airway patency: patent  Anesthetic complications: no  Cardiovascular status: hemodynamically stable  Respiratory status: acceptable  Hydration status: euvolemic    PONV: none           Nurse Pain Score: 0 (NPRS)

## 2020-10-15 NOTE — OP REPORT
DATE OF SERVICE:  10/15/2020    PREOPERATIVE DIAGNOSIS:  Right knee medial meniscus retear after previous   failed meniscal repair.    POSTOPERATIVE DIAGNOSES:  1.  Right knee medial meniscus retear after previous failed medial meniscus   repair.  2.  Right knee loose body.  3.  Right knee extensive synovial hypertrophy.    PROCEDURES:  1.  Right knee diagnostic arthroscopy.  2.  Right knee partial medial meniscectomy.  3.  Right knee loose body removal.  4.  Right knee extensive synovial debridement.    ATTENDING SURGEON:  Isabel Seals MD    ASSISTANT:  None.    ANESTHESIA:  General.    ANESTHESIOLOGIST:  Ted Zheng MD    SPECIMENS:  None.    ESTIMATED BLOOD LOSS:  Less than 5 mL.    TOURNIQUET TIME:  None.    FINDINGS:  There were 3 anchors visible from a previous medial meniscus repair   that had not healed.  There was persistent tearing of  the posterior horn and body of the medial meniscus with both horizontal and   vertical patterns.  There was a groove of chondromalacia on the lateral aspect   of the medial femoral condyle, but grade II chondromalacia.  There was   extensive synovial hypertrophy and scarring throughout all 3 compartments.    There was a chondral loose body in the lateral gutter.  The cartilage of the   lateral compartment and patellofemoral compartment was pristine.  The lateral   meniscus, ACL and PCL were intact and pristine.    POSTOPERATIVE PLAN:  The patient will be weightbearing as tolerates and range   of motion as tolerates on his right lower extremity foot.  He will start   physical therapy in the next 2 to 3 days, working on knee range of motion and   quadriceps strengthening.  He will follow up in clinic in 7 to 10 days for   suture removal and wound check.    INDICATIONS FOR THE PROCEDURE:  The patient is a very nice 35-year-old very   active male who presented to clinic almost a year ago for medial-sided right   knee pain after he was squatting and twisted back in  early of 2020.  The   patient underwent a right knee scope with all-inside medial meniscus repair on   05/28/2020 where 3 Fast-Fix meniscal repair anchors were placed.  The patient   did okay after surgery, but continued to have pain and swelling and a new MRI   revealed likely persistent tearing and failure of healing of the medial   meniscus repair and he was indicated for repeat surgery.  I had a long   discussion with the patient regarding the risks and benefits of surgery   including but not limited to bleeding, infection, risk to surrounding   structures such as blood vessels or nerves, pain, scar, reoperation, and risks   of anesthesia such as stroke, heart attack, deep venous thrombosis, pulmonary   embolism, and death.  The patient understood the risks and benefits and   elected to proceed with surgery.    PROCEDURE IN DETAIL:  The patient was met in the preoperative hold area where   the correct right knee was marked with my initials.  He was then transferred   to the operating room where he was placed supine on the operating room table   with all his bony prominences were well padded.  He received 2 grams of   preoperative Ancef.  He was intubated by the anesthesia team without   complications.  Preoperative exam under anesthesia revealed right knee with a   moderate knee effusion.  He had well-healed portal incisions.  Full range of   motion 0 to 130 degrees and he was stable to varus and valgus stress, had a   negative Lachman, negative posterior drawer.  The patient's right lower   extremity was then prepped and draped in the usual sterile fashion.  A   preoperative timeout was held, where all those in the room agreed upon the   correct patient, the correct site of surgery and the correct surgery to be   performed.    I began the procedure by injecting 50 mL of combination of 0.25% Marcaine with   epinephrine and 1% lidocaine into the right knee via the superolateral   approach.  I then used my same  portals from his previous surgery and these   were opened with an #11 blade.  I then inserted the scope into the   patellofemoral compartment and there was quite a bit of scarring anteriorly,   likely from the microfracture after his first surgery that we used to aid with   meniscal healing.  At this point, the 4.0 mm sucker shaver was inserted   through the anteromedial portal and I performed an extensive synovectomy and   lysis of adhesions within the anterior compartment.  The scope was then moved   to the lateral gutter where I did see a loose body that appeared chondral in   nature.  This was removed without complications.  There were no loose bodies   in the medial gutter.  The knee was then placed into full extension with a   valgus stress and a probe was inserted through the anteromedial portal.  At   this point, I probed a persistent tear that had not healed of the posterior   horn and body of the medial meniscus.  The tear was very complex in nature   with both horizontal and vertical components.  I was able to visualize the 3   Fast-Fix meniscal anchors that had been placed and it actually looked like one   of the darts from the anchors had become dislodged.  There was actually a   large groove on the lateral aspect of the medial femoral condyle cartilage   that possibly could have been as a result of this dislodged anchor.  The   cartilage groove was about a grade II in regards to chondromalacia.  At this   point, I used a combination of an arthroscopic biter and a 4.0 mm sucker   shaver to perform my partial medial meniscectomy, removing the anchors, suture   and a persistent tearing of the medial meniscus back to a stable edge.  There   was some slight grade I chondromalacia of the medial tibial condyle, but   other than that groove on the lateral aspect of the medial femoral condyle,   the cartilage looked pretty good.  At this point, the knee was placed into 90   degrees of flexion where again I  examined an intact and competent ACL and PCL.    Again, there was scarring anteriorly, which I debrided with the 4.0 mm   sucker shaver and Arthrocare wand.  The knee was then placed in a   figure-of-four position where I examined an intact and competent lateral   meniscus with no tearing and the cartilage of the lateral compartment was   pristine.  I then performed a Gillquist maneuver to check the back of the knee   and make sure that there were no anchors or darts from the meniscal repair   floating in the back of the knee.  I made a posterior medial portal using a   spinal needle for localization.  I then inserted the probe through the   posterior medial portal and verified that there were no loose bodies or   anchors in the back of the knee.  This was also verified using a 70-degree   scope and I was confident there were no loose bodies or anchors in the back of   the knee.  The 30-degree scope was then placed back and the knee was fully   extended and I copiously irrigated out the entire knee and performed my   synovectomy using the 4.0 mm sucker shaver and Arthrocare wands.  All loose   bodies were removed from the knee with a 4.0 mm sucker shaver and a spinal   needle was placed in the superolateral aspect of the knee under direct   visualization.  The scope and slid were then removed from the knee and the   portals were closed with 3-0 Prolene.  An injection of Duramorph and the local   mixture was then placed through the previously placed spinal needle.    Steri-Strips followed by sterile dressings were then applied and the patient   was awoken from anesthesia without complications.  Please note that all counts   were correct at the end of the case.       ____________________________________     MD DINH Barrera / SONIDO    DD:  10/15/2020 12:59:03  DT:  10/15/2020 13:28:26    D#:  [id number]  Job#:  [id number]

## 2020-10-15 NOTE — DISCHARGE INSTRUCTIONS
ACTIVITY: Rest and take it easy for the first 24 hours.  A responsible adult is recommended to remain with you during that time.  It is normal to feel sleepy.  We encourage you to not do anything that requires balance, judgment or coordination.    MILD FLU-LIKE SYMPTOMS ARE NORMAL. YOU MAY EXPERIENCE GENERALIZED MUSCLE ACHES, THROAT IRRITATION, HEADACHE AND/OR SOME NAUSEA.    FOR 24 HOURS DO NOT:  Drive, operate machinery or run household appliances.  Drink beer or alcoholic beverages.   Make important decisions or sign legal documents.    SPECIAL INSTRUCTIONS: Weight bearing as tolerated to right lower extremity. Elevate right knee above heart level. Ice pack as directed. Follow Dr Seals discharge instructions in packet.     DIET: To avoid nausea, slowly advance diet as tolerated, avoiding spicy or greasy foods for the first day.  Add more substantial food to your diet according to your physician's instructions.  INCREASE FLUIDS AND FIBER TO AVOID CONSTIPATION.    FOLLOW-UP APPOINTMENT:  A follow-up appointment should be arranged with your doctor in office as instructed; call to schedule.    You should CALL YOUR PHYSICIAN if you develop:  Fever greater than 101 degrees F.  Pain not relieved by medication, or persistent nausea or vomiting.  Excessive bleeding (blood soaking through dressing) or unexpected drainage from the wound.  Extreme redness or swelling around the incision site, drainage of pus or foul smelling drainage.  Inability to urinate or empty your bladder within 8 hours.  Problems with breathing or chest pain.    You should call 911 if you develop problems with breathing or chest pain.  If you are unable to contact your doctor or surgical center, you should go to the nearest emergency room or urgent care center.  Dr Seals's telephone #: [phone]    If any questions arise, call your doctor.  If your doctor is not available, please feel free to call the Surgical Center at [phone]. The  Contact Center is open Monday through Friday 7AM to 5PM and may speak to a nurse at [phone], or toll free at [phone].     A registered nurse may call you a few days after your surgery to see how you are doing after your procedure.    MEDICATIONS: Resume taking daily medication.  Take prescribed pain medication with food.  If no medication is prescribed, you may take non-aspirin pain medication if needed.  PAIN MEDICATION CAN BE VERY CONSTIPATING.  Take a stool softener or laxative such as senokot, pericolace, or milk of magnesia if needed.    Prescription given for Zofran, Naproxyn, Norco.  Last pain medication given at 1:42pm Oxycodone 10 mg.    If your physician has prescribed pain medication that includes Acetaminophen (Tylenol), do not take additional Acetaminophen (Tylenol) while taking the prescribed medication.    Depression / Suicide Risk    As you are discharged from this [facility] facility, it is important to learn how to keep safe from harming yourself.    Recognize the warning signs:  · Abrupt changes in personality, positive or negative- including increase in energy   · Giving away possessions  · Change in eating patterns- significant weight changes-  positive or negative  · Change in sleeping patterns- unable to sleep or sleeping all the time   · Unwillingness or inability to communicate  · Depression  · Unusual sadness, discouragement and loneliness  · Talk of wanting to die  · Neglect of personal appearance   · Rebelliousness- reckless behavior  · Withdrawal from people/activities they love  · Confusion- inability to concentrate     If you or a loved one observes any of these behaviors or has concerns about self-harm, here's what you can do:  · Talk about it- your feelings and reasons for harming yourself  · Remove any means that you might use to hurt yourself (examples: pills, rope, extension cords, firearm)  · Get professional help from the community (Mental Health, Substance  Abuse, psychological counseling)  · Do not be alone:Call your Safe Contact- someone whom you trust who will be there for you.  · Call your local CRISIS HOTLINE 765-4217 or 532-185-8353  · Call your local Children's Mobile Crisis Response Team [place] (909) 894-1297 or www.[employer]  · Call the toll free National Suicide Prevention Hotlines   · National Suicide Prevention Lifeline 582-310-DHAJ (2841)  · National Hope Line Network 800-SUICIDE (364-1816)

## 2020-10-15 NOTE — PROGRESS NOTES
"1245 To PACU from OR via gurney, side rails up x 2 for safety, lungs clear bilaterally, scds on patient and machine operational, chin lift in effect by Dr Zheng on arrival and held while RN hooks up patient. Dressing CDI to R knee with +2 R pedal pulse and debi hose in place BLE.   1246 Head positioned by Dr Zheng and chin lift no longer needed for effective ventilation; RN will continuously monitor.   1300 OPA removed and pt opens eyes briefly. Breathing remains easy and unlabored. Shivering noted and pt declines feeling cold. Pt reports pain to R knee of 9/10 and denies nausea. RLE elevated above heart level with pillow and gurney position due to \"throbbing\" description.   1315 Pt reports pain as continued as 9/10 to R knee.   1330 Pt arouses to voice and 7/10 as pt describes \"slight edge off\" to R knee. Pt states he needs food on stomach to take oral PRN pain medication.   1345 Pt resting quietly on gurney. Pain rated as not tolerable at 7-8/10 to R knee. Instructed to DB/C; demonstrated understanding. Shivering resolved.   1400 No changes  1415 Pt reports pain as continued at 7/10 and not tolerable. Pain goal 5-6/10. Denies nausea.   1430 Meets criteria for transfer to stage II.       "

## 2020-10-15 NOTE — ANESTHESIA PROCEDURE NOTES
Airway    Date/Time: 10/15/2020 11:27 AM  Performed by: Ted Zheng M.D.  Authorized by: Ted Zheng M.D.     Location:  OR  Urgency:  Elective  Indications for Airway Management:  Anesthesia      Spontaneous Ventilation: absent    Sedation Level:  Deep  Preoxygenated: Yes    Final Airway Type:  Supraglottic airway  Final Supraglottic Airway:  Standard LMA    SGA Size:  4  Number of Attempts at Approach:  1

## 2020-10-15 NOTE — OR SURGEON
Immediate Post OP Note    PreOp Diagnosis: Right knee medial meniscus retear after previous failed medial meniscus repair    PostOp Diagnosis: 1) Right knee medial meniscus retear after previous failed medial meniscus repair  2) Right knee loose body  3) Right knee synovial hypertrophy    Procedure(s):  ARTHROSCOPY, KNEE - Wound Class: Clean  MENISCECTOMY, KNEE, MEDIAL - PARTIAL,  synovectomy loose body removal - Wound Class: Clean    Surgeon(s):  Isabel Seals M.D.    Anesthesiologist/Type of Anesthesia:  Anesthesiologist: Ted Zheng M.D./General    Surgical Staff:  Circulator: Garry Barker R.N.  Scrub Person: Lorne Lozoya    Specimens removed if any:  None    Estimated Blood Loss: <5 mL    Findings: The previous medial meniscus repair had not healed and there was a persistent complex tear of the posterior horn and body of the medial meniscus with retained anchors.  There was grade 2 chondromalacia of the MFC as well as extensive synovial hypertrophy and a chondral loose body.    Complications: none        10/15/2020 12:48 PM Isabel Seals M.D.

## (undated) DEVICE — SUCTION INSTRUMENT YANKAUER BULBOUS TIP W/O VENT (50EA/CA)

## (undated) DEVICE — LACTATED RINGERS INJ 1000 ML - (14EA/CA 60CA/PF)

## (undated) DEVICE — SENSOR SPO2 NEO LNCS ADHESIVE (20/BX) SEE USER NOTES

## (undated) DEVICE — PROTECTOR ULNA NERVE - (36PR/CA)

## (undated) DEVICE — SUTURE GENERAL

## (undated) DEVICE — NEPTUNE 4 PORT MANIFOLD - (20/PK)

## (undated) DEVICE — GLOVE, LITE (PAIR)

## (undated) DEVICE — GLOVE BIOGEL PI INDICATOR SZ 7.0 SURGICAL PF LF - (50/BX 4BX/CA)

## (undated) DEVICE — GOWN WARMING STANDARD FLEX - (30/CA)

## (undated) DEVICE — SHAVER4.0 RESECTOR FORMULA - (5EA/BX)

## (undated) DEVICE — SODIUM CHL IRRIGATION 0.9% 1000ML (12EA/CA)

## (undated) DEVICE — CLOSURE SKIN STRIP 1/2 X 4 IN - (STERI STRIP) (50/BX 4BX/CA)

## (undated) DEVICE — TUBE CONNECTING SUCTION - CLEAR PLASTIC STERILE 72 IN (50EA/CA)

## (undated) DEVICE — ELECTRODE 850 FOAM ADHESIVE - HYDROGEL RADIOTRNSPRNT (50/PK)

## (undated) DEVICE — PADDING CAST 6 IN STERILE - 6 X 4 YDS (24/CA)

## (undated) DEVICE — CAST PADDING 6 X 4 YDS

## (undated) DEVICE — PACK KNEE ARTHROSCOPY SM OR - (2EA/CA)

## (undated) DEVICE — HEAD HOLDER JUNIOR/ADULT

## (undated) DEVICE — GAUZE FLUFF STERILE 2-PLY 36 X 36 (100EA/CA)

## (undated) DEVICE — ARTHROWAND TURBOVAC 3.5/90 SCT

## (undated) DEVICE — SUTURE 3-0 PROLENE SH 30 (36PK/BX)"

## (undated) DEVICE — SUTURE 3-0 ETHILON FS-1 - (36/BX) 30 INCH

## (undated) DEVICE — GLOVE BIOGEL SZ 7 SURGICAL PF LTX - (50PR/BX 4BX/CA)

## (undated) DEVICE — DRAPE SURGICAL U 77X120 - (10/CA)

## (undated) DEVICE — DRAPE LARGE 3 QUARTER - (20/CA)

## (undated) DEVICE — TUBING PATIENT W/CONNECTOR REDEUCE (1EA)

## (undated) DEVICE — ELECTRODE DUAL RETURN W/ CORD - (50/PK)

## (undated) DEVICE — MASK ANESTHESIA ADULT  - (100/CA)

## (undated) DEVICE — HUMID-VENT HEAT AND MOISTURE EXCHANGE- (50/BX)

## (undated) DEVICE — WATER IRRIGATION STERILE 1000ML (12EA/CA)

## (undated) DEVICE — KIT ANESTHESIA W/CIRCUIT & 3/LT BAG W/FILTER (20EA/CA)

## (undated) DEVICE — BANDAGE ELASTIC LATEX STERILE VELCRO 4 X 5 YDS (25EA/CA)

## (undated) DEVICE — DRESSING 3X8 ADAPTIC GAUZE - NON-ADHERING (36/PK 6PK/BX)

## (undated) DEVICE — MASK, LARYNGEAL AIRWAY #4

## (undated) DEVICE — PADDING CAST 4 IN STERILE - 4 X 4 YDS (24/CA)

## (undated) DEVICE — KNOT PUSHER ULTRA FAST FIX - SNEP

## (undated) DEVICE — SODIUM CHL. IRRIGATION 0.9% 3000ML (4EA/CA 65CA/PF)

## (undated) DEVICE — TUBING PUMP WITH CONNECTOR REDEUCE (1EA)

## (undated) DEVICE — CHLORAPREP 26 ML APPLICATOR - ORANGE TINT(25/CA)

## (undated) DEVICE — BAG SPONGE COUNT 10.25 X 32 - BLUE (250/CA)

## (undated) DEVICE — SUTURE 3-0 MONOCRYL PLUS PS-2 - (12/BX)

## (undated) DEVICE — DRAPE IOBAN II INCISE 23X17 - (10EA/BX 4BX/CA)

## (undated) DEVICE — DRAPE LOWER EXTREMETY - (6/CA)

## (undated) DEVICE — CANISTER SUCTION RIGID RED 1500CC (40EA/CA)